# Patient Record
Sex: MALE | Race: WHITE | NOT HISPANIC OR LATINO | Employment: FULL TIME | URBAN - METROPOLITAN AREA
[De-identification: names, ages, dates, MRNs, and addresses within clinical notes are randomized per-mention and may not be internally consistent; named-entity substitution may affect disease eponyms.]

---

## 2018-09-12 ENCOUNTER — OFFICE VISIT (OUTPATIENT)
Dept: URGENT CARE | Facility: CLINIC | Age: 23
End: 2018-09-12

## 2018-09-12 VITALS
SYSTOLIC BLOOD PRESSURE: 137 MMHG | OXYGEN SATURATION: 98 % | TEMPERATURE: 98 F | HEART RATE: 104 BPM | WEIGHT: 300 LBS | DIASTOLIC BLOOD PRESSURE: 87 MMHG

## 2018-09-12 DIAGNOSIS — H57.13 PAIN IN EYE, BILATERAL: ICD-10-CM

## 2018-09-12 DIAGNOSIS — H16.133 FLASH BURN OF BOTH EYES: Primary | ICD-10-CM

## 2018-09-12 PROCEDURE — 99213 OFFICE O/P EST LOW 20 MIN: CPT | Mod: S$GLB,,, | Performed by: INTERNAL MEDICINE

## 2018-09-12 RX ORDER — POLYMYXIN B SULFATE AND TRIMETHOPRIM 1; 10000 MG/ML; [USP'U]/ML
1 SOLUTION OPHTHALMIC 4 TIMES DAILY
Qty: 10 BOTTLE | Refills: 0 | Status: SHIPPED | OUTPATIENT
Start: 2018-09-12 | End: 2018-09-17

## 2018-09-12 NOTE — PATIENT INSTRUCTIONS
Flash Burn to Eye    A flash burn or s burn occurs when a person is exposed to too much ultraviolet light. This usually occurs during welding without proper eye protection. It may also occur from getting too much sunlight in high-glare places like the beach or in snow.  The injury is to the cornea, which is the clear part in the front of the eye. This sensitive area is very painful when injured, but it usually heals rapidly. The pain usually goes away within 24 to 48 hours.  You may be given eye drops to dilate the pupil if you are in pain. Eye drops may also help to prevent an infection. The eye drops may continue to work from hours to days, causing blurriness in the eye.  Home care  · A cold pack (ice in a plastic bag, wrapped in a towel) may be applied over the eye for 20 minutes at a time to reduce pain.  · Wear sunglasses if you are sensitive to the light.  · You may use acetaminophen or ibuprofen to control pain, unless another medicine was prescribed. (Note: If you have chronic liver or kidney disease or ever had a stomach ulcer or GI bleeding, talk with your healthcare provider before using these medicines.)  · If an eye patch was applied:  ¨ You may place the ice pack over the eye-patch.  ¨ If you were given a follow-up appointment for patch removal and re-exam, do not miss it. An eye patch should not be left in place for more than 48 hours, unless advised to do so by your healthcare provider.  ¨ Do not drive a motor vehicle or use machinery with the patch in place. You will have trouble judging distances with only one eye.  ¨ If an eye drop or ointment was prescribed, use it as directed.  ¨ Do not wear contact lenses until your eyes have healed and all symptoms are gone.  Prevention  Take these precautions to prevent this injury in the future.  · Wear a 's mask when welding.  · Wear totally dark glasses when using tanning beds.  · Wear sunglasses with UV-A and UV-B protection for beach and  snow.  Follow-up care  Follow up with your healthcare provider, or as directed. If your eye was patched, follow the advice given for patch removal.  · If you were asked to remove an eye patch it yourself, see your healthcare provider or return to this facility if you still have pain after removing it.  · If you were given a follow-up appointment for patch removal and re-exam, do not miss it. An eye patch should not be left in place for more than 48 hours, unless you are advised to do so by your healthcare provider.  When to seek medical advice  Call your health care provider right away if any of these occur.  · Increase in eye pain, or pain that does not improve after 48 hours  · Discharge from the eye  · Redness of the eye or swelling of the eyelids  · Worsening vision  Date Last Reviewed: 6/14/2015  © 8229-1769 [employer]. [address] [place] [address], [place] [address]. All rights reserved. This information is not intended as a substitute for professional medical care. Always follow your healthcare professional's instructions.

## 2018-09-12 NOTE — LETTER
September 12, 2018      [facility]  [place]  [address] [facility] [place] [address]  Phone: [phone]  Fax: [phone]       Patient: Adebayo Salgado   YOB: 1995  Date of Visit: 09/12/2018    To Whom It May Concern:    Ignacio Salgado  was at [facility] on 09/12/2018. He may return to work/school on 08/14/2018 with no restrictions. If you have any questions or concerns, or if I can be of further assistance, please do not hesitate to contact me.    Sincerely,    Vinicius Roman MA

## 2018-09-12 NOTE — PROGRESS NOTES
Subjective:       Patient ID: Adebayo Salgado Jr. is a 23 y.o. male.    Vitals:  weight is 136.1 kg (300 lb). His oral temperature is 97.9 °F (36.6 °C). His blood pressure is 137/87 and his pulse is 104. His oxygen saturation is 98%.     Chief Complaint: Eye Pain (bilateral)    Eye Pain    Both eyes are affected.This is a new problem. The current episode started today. The problem occurs constantly. The problem has been gradually worsening. The injury mechanism was a welding arc exposure. The pain is at a severity of 5/10. The pain is moderate. There is no known exposure to pink eye. He does not wear contacts. Associated symptoms include blurred vision, an eye discharge and eye redness. Pertinent negatives include no fever, nausea or vomiting. He has tried nothing for the symptoms.     Review of Systems   Constitution: Negative for chills and fever.   HENT: Negative for sore throat.    Eyes: Positive for blurred vision, discharge, pain, redness and vision loss in right eye. Negative for vision loss in left eye.   Cardiovascular: Negative for chest pain.   Respiratory: Negative for shortness of breath.    Skin: Negative for rash.   Musculoskeletal: Negative for back pain and joint pain.   Gastrointestinal: Negative for abdominal pain, diarrhea, nausea and vomiting.   Neurological: Negative for headaches.   Psychiatric/Behavioral: The patient is not nervous/anxious.        Objective:      Physical Exam   Constitutional: He is oriented to person, place, and time. He appears well-developed and well-nourished. He is cooperative.  Non-toxic appearance. He does not appear ill. No distress.   HENT:   Head: Normocephalic and atraumatic.   Right Ear: Hearing, tympanic membrane, external ear and ear canal normal.   Left Ear: Hearing, tympanic membrane, external ear and ear canal normal.   Nose: Rhinorrhea present. No mucosal edema or nasal deformity. No epistaxis. Right sinus exhibits no maxillary sinus tenderness and no  frontal sinus tenderness. Left sinus exhibits no maxillary sinus tenderness and no frontal sinus tenderness.   Mouth/Throat: Uvula is midline, oropharynx is clear and moist and mucous membranes are normal. No trismus in the jaw. Normal dentition. No uvula swelling. No posterior oropharyngeal erythema.   Eyes: EOM and lids are normal. Pupils are equal, round, and reactive to light. Right eye exhibits discharge. Right eye exhibits no hordeolum. No foreign body present in the right eye. Left eye exhibits no discharge and no hordeolum. No foreign body present in the left eye. Right conjunctiva is injected. Left conjunctiva is injected. No scleral icterus.   Sclera clear bilat   Neck: Trachea normal, normal range of motion, full passive range of motion without pain and phonation normal. Neck supple.   Cardiovascular: Normal rate, regular rhythm, normal heart sounds, intact distal pulses and normal pulses.   Pulmonary/Chest: Effort normal and breath sounds normal. No respiratory distress.   Abdominal: Soft. Normal appearance and bowel sounds are normal. He exhibits no distension, no pulsatile midline mass and no mass. There is no tenderness.   Musculoskeletal: Normal range of motion. He exhibits no edema or deformity.   Neurological: He is alert and oriented to person, place, and time. He exhibits normal muscle tone. Coordination normal.   Skin: Skin is warm, dry and intact. He is not diaphoretic. No pallor.   Psychiatric: He has a normal mood and affect. His speech is normal and behavior is normal. Judgment and thought content normal. Cognition and memory are normal.   Nursing note and vitals reviewed.      Assessment:       No diagnosis found.    Plan:         There are no diagnoses linked to this encounter.